# Patient Record
Sex: FEMALE | Race: BLACK OR AFRICAN AMERICAN | NOT HISPANIC OR LATINO | Employment: UNEMPLOYED | URBAN - METROPOLITAN AREA
[De-identification: names, ages, dates, MRNs, and addresses within clinical notes are randomized per-mention and may not be internally consistent; named-entity substitution may affect disease eponyms.]

---

## 2024-04-29 ENCOUNTER — LAB (OUTPATIENT)
Dept: LAB | Facility: LAB | Age: 69
End: 2024-04-29
Payer: MEDICARE

## 2024-04-29 ENCOUNTER — OFFICE VISIT (OUTPATIENT)
Dept: PRIMARY CARE | Facility: CLINIC | Age: 69
End: 2024-04-29
Payer: MEDICARE

## 2024-04-29 VITALS
OXYGEN SATURATION: 99 % | BODY MASS INDEX: 30.94 KG/M2 | DIASTOLIC BLOOD PRESSURE: 144 MMHG | HEIGHT: 71 IN | SYSTOLIC BLOOD PRESSURE: 228 MMHG | TEMPERATURE: 97.2 F | WEIGHT: 221 LBS | HEART RATE: 72 BPM

## 2024-04-29 DIAGNOSIS — I10 PRIMARY HYPERTENSION: ICD-10-CM

## 2024-04-29 DIAGNOSIS — E89.0 H/O THYROIDECTOMY: ICD-10-CM

## 2024-04-29 DIAGNOSIS — Z86.2 HISTORY OF ANEMIA: ICD-10-CM

## 2024-04-29 DIAGNOSIS — I10 PRIMARY HYPERTENSION: Primary | ICD-10-CM

## 2024-04-29 DIAGNOSIS — N18.32 CHRONIC KIDNEY DISEASE, STAGE 3B (MULTI): ICD-10-CM

## 2024-04-29 DIAGNOSIS — R73.03 PREDIABETES: ICD-10-CM

## 2024-04-29 DIAGNOSIS — Z86.39 HISTORY OF VITAMIN D DEFICIENCY: ICD-10-CM

## 2024-04-29 LAB
ALBUMIN SERPL BCP-MCNC: 3.3 G/DL (ref 3.4–5)
ALP SERPL-CCNC: 150 U/L (ref 33–136)
ALT SERPL W P-5'-P-CCNC: 13 U/L (ref 7–45)
ANION GAP SERPL CALC-SCNC: 13 MMOL/L (ref 10–20)
AST SERPL W P-5'-P-CCNC: 22 U/L (ref 9–39)
BILIRUB SERPL-MCNC: 0.5 MG/DL (ref 0–1.2)
BUN SERPL-MCNC: 19 MG/DL (ref 6–23)
CALCIUM SERPL-MCNC: 9.3 MG/DL (ref 8.6–10.6)
CHLORIDE SERPL-SCNC: 103 MMOL/L (ref 98–107)
CHOLEST SERPL-MCNC: 327 MG/DL (ref 0–199)
CHOLESTEROL/HDL RATIO: 5.4
CO2 SERPL-SCNC: 27 MMOL/L (ref 21–32)
CREAT SERPL-MCNC: 1.37 MG/DL (ref 0.5–1.05)
CREAT UR-MCNC: 206.8 MG/DL (ref 20–320)
EGFRCR SERPLBLD CKD-EPI 2021: 42 ML/MIN/1.73M*2
ERYTHROCYTE [DISTWIDTH] IN BLOOD BY AUTOMATED COUNT: 16.7 % (ref 11.5–14.5)
EST. AVERAGE GLUCOSE BLD GHB EST-MCNC: 114 MG/DL
GLUCOSE SERPL-MCNC: 90 MG/DL (ref 74–99)
HBA1C MFR BLD: 5.6 %
HCT VFR BLD AUTO: 46.2 % (ref 36–46)
HDLC SERPL-MCNC: 60.1 MG/DL
HGB BLD-MCNC: 14.5 G/DL (ref 12–16)
LDLC SERPL CALC-MCNC: 236 MG/DL
MCH RBC QN AUTO: 25.8 PG (ref 26–34)
MCHC RBC AUTO-ENTMCNC: 31.4 G/DL (ref 32–36)
MCV RBC AUTO: 82 FL (ref 80–100)
MICROALBUMIN UR-MCNC: >2250 MG/L
MICROALBUMIN/CREAT UR: NORMAL MG/G{CREAT}
NON HDL CHOLESTEROL: 267 MG/DL (ref 0–149)
NRBC BLD-RTO: 0 /100 WBCS (ref 0–0)
PLATELET # BLD AUTO: 284 X10*3/UL (ref 150–450)
POTASSIUM SERPL-SCNC: 3.9 MMOL/L (ref 3.5–5.3)
PROT SERPL-MCNC: 7.1 G/DL (ref 6.4–8.2)
RBC # BLD AUTO: 5.61 X10*6/UL (ref 4–5.2)
SODIUM SERPL-SCNC: 139 MMOL/L (ref 136–145)
T4 FREE SERPL-MCNC: 1.34 NG/DL (ref 0.78–1.48)
TRIGL SERPL-MCNC: 153 MG/DL (ref 0–149)
TSH SERPL-ACNC: 11.52 MIU/L (ref 0.44–3.98)
VLDL: 31 MG/DL (ref 0–40)
WBC # BLD AUTO: 7.3 X10*3/UL (ref 4.4–11.3)

## 2024-04-29 PROCEDURE — 84439 ASSAY OF FREE THYROXINE: CPT

## 2024-04-29 PROCEDURE — 84443 ASSAY THYROID STIM HORMONE: CPT

## 2024-04-29 PROCEDURE — 82043 UR ALBUMIN QUANTITATIVE: CPT

## 2024-04-29 PROCEDURE — 3077F SYST BP >= 140 MM HG: CPT | Performed by: STUDENT IN AN ORGANIZED HEALTH CARE EDUCATION/TRAINING PROGRAM

## 2024-04-29 PROCEDURE — 85027 COMPLETE CBC AUTOMATED: CPT

## 2024-04-29 PROCEDURE — 36415 COLL VENOUS BLD VENIPUNCTURE: CPT

## 2024-04-29 PROCEDURE — G2212 PROLONG OUTPT/OFFICE VIS: HCPCS | Performed by: STUDENT IN AN ORGANIZED HEALTH CARE EDUCATION/TRAINING PROGRAM

## 2024-04-29 PROCEDURE — 1159F MED LIST DOCD IN RCRD: CPT | Performed by: STUDENT IN AN ORGANIZED HEALTH CARE EDUCATION/TRAINING PROGRAM

## 2024-04-29 PROCEDURE — 99214 OFFICE O/P EST MOD 30 MIN: CPT | Performed by: STUDENT IN AN ORGANIZED HEALTH CARE EDUCATION/TRAINING PROGRAM

## 2024-04-29 PROCEDURE — 82570 ASSAY OF URINE CREATININE: CPT

## 2024-04-29 PROCEDURE — 80053 COMPREHEN METABOLIC PANEL: CPT

## 2024-04-29 PROCEDURE — 1126F AMNT PAIN NOTED NONE PRSNT: CPT | Performed by: STUDENT IN AN ORGANIZED HEALTH CARE EDUCATION/TRAINING PROGRAM

## 2024-04-29 PROCEDURE — 1036F TOBACCO NON-USER: CPT | Performed by: STUDENT IN AN ORGANIZED HEALTH CARE EDUCATION/TRAINING PROGRAM

## 2024-04-29 PROCEDURE — 83036 HEMOGLOBIN GLYCOSYLATED A1C: CPT

## 2024-04-29 PROCEDURE — 80061 LIPID PANEL: CPT

## 2024-04-29 PROCEDURE — 3080F DIAST BP >= 90 MM HG: CPT | Performed by: STUDENT IN AN ORGANIZED HEALTH CARE EDUCATION/TRAINING PROGRAM

## 2024-04-29 RX ORDER — LEVOTHYROXINE SODIUM 125 UG/1
125 TABLET ORAL DAILY
Qty: 90 TABLET | Refills: 3 | Status: SHIPPED | OUTPATIENT
Start: 2024-04-29 | End: 2024-04-30 | Stop reason: SDUPTHER

## 2024-04-29 RX ORDER — LEVOTHYROXINE SODIUM 125 UG/1
125 TABLET ORAL DAILY
COMMUNITY
End: 2024-04-29 | Stop reason: SDUPTHER

## 2024-04-29 RX ORDER — LISINOPRIL 40 MG/1
40 TABLET ORAL DAILY
Qty: 90 TABLET | Refills: 3 | Status: SHIPPED | OUTPATIENT
Start: 2024-04-29

## 2024-04-29 RX ORDER — FERROUS SULFATE 324(65)MG
1 TABLET, DELAYED RELEASE (ENTERIC COATED) ORAL DAILY
Qty: 90 TABLET | Refills: 3 | Status: SHIPPED | OUTPATIENT
Start: 2024-04-29

## 2024-04-29 RX ORDER — TRIAMTERENE/HYDROCHLOROTHIAZID 37.5-25 MG
1 TABLET ORAL DAILY
COMMUNITY
End: 2024-04-29 | Stop reason: SDUPTHER

## 2024-04-29 RX ORDER — ATORVASTATIN CALCIUM 40 MG/1
40 TABLET, FILM COATED ORAL NIGHTLY
Qty: 90 TABLET | Refills: 3 | Status: SHIPPED | OUTPATIENT
Start: 2024-04-29

## 2024-04-29 RX ORDER — AMPICILLIN TRIHYDRATE 500 MG
25 CAPSULE ORAL DAILY
Qty: 90 CAPSULE | Refills: 3 | Status: SHIPPED | OUTPATIENT
Start: 2024-04-29

## 2024-04-29 RX ORDER — TRIAMTERENE/HYDROCHLOROTHIAZID 37.5-25 MG
1 TABLET ORAL DAILY
Qty: 90 TABLET | Refills: 3 | Status: SHIPPED | OUTPATIENT
Start: 2024-04-29

## 2024-04-29 RX ORDER — AMPICILLIN TRIHYDRATE 500 MG
25 CAPSULE ORAL DAILY
COMMUNITY
End: 2024-04-29 | Stop reason: SDUPTHER

## 2024-04-29 RX ORDER — FERROUS SULFATE 324(65)MG
1 TABLET, DELAYED RELEASE (ENTERIC COATED) ORAL DAILY
COMMUNITY
End: 2024-04-29 | Stop reason: SDUPTHER

## 2024-04-29 RX ORDER — ATORVASTATIN CALCIUM 40 MG/1
40 TABLET, FILM COATED ORAL NIGHTLY
COMMUNITY
End: 2024-04-29 | Stop reason: SDUPTHER

## 2024-04-29 RX ORDER — LISINOPRIL 40 MG/1
40 TABLET ORAL DAILY
COMMUNITY
Start: 2023-10-31 | End: 2024-04-29 | Stop reason: SDUPTHER

## 2024-04-29 ASSESSMENT — ENCOUNTER SYMPTOMS
UNEXPECTED WEIGHT CHANGE: 0
COUGH: 0
FATIGUE: 1
ABDOMINAL PAIN: 0
APPETITE CHANGE: 0
DIZZINESS: 0
SHORTNESS OF BREATH: 0
HEADACHES: 0
BLOOD IN STOOL: 0
CONSTIPATION: 0
DIARRHEA: 0
FEVER: 0
FREQUENCY: 1
CHILLS: 0
LIGHT-HEADEDNESS: 0
PALPITATIONS: 0
DYSURIA: 0

## 2024-04-29 ASSESSMENT — PAIN SCALES - GENERAL: PAINLEVEL: 0-NO PAIN

## 2024-04-29 NOTE — PROGRESS NOTES
"Subjective   Patient ID: Renata Arias is a 68 y.o. female who presents for Med Refill and Establish Care.    HPI   - Graves disease  Denies heat intolerance, weight gain  Takes thyroid supplementation    -Hx of anemia  Endorses fatigue. Denies SOB  Is on iron supplementation    -HTN  Denies HA, vision changes, chest pain, palpitations, leg swelling  Hasn't taken medications in two weeks  Has BP cuff at home but doesn't know how to use it  Blood pressure tracker and goal     -Urge incontinence and nocturia  Discussed not drinking water after 5pm    -Hip Pain  Worse with walking, not interested in taking Tylenol    Medication refill  - Levothyroxine, atorvastatin, lisinopril, triamterene-hydrochlorothiazide, vitamin D3, ferrous sulfate    Review of Systems   Constitutional:  Positive for fatigue. Negative for appetite change, chills, fever and unexpected weight change.   Respiratory:  Negative for cough and shortness of breath.    Cardiovascular:  Negative for chest pain, palpitations and leg swelling.   Gastrointestinal:  Negative for abdominal pain, blood in stool, constipation and diarrhea.   Genitourinary:  Positive for frequency and urgency. Negative for dysuria and vaginal pain.        Nocturia, endorses urge incontinence   Neurological:  Negative for dizziness, light-headedness and headaches.       Objective   BP (!) 228/144 (BP Location: Left arm, Patient Position: Sitting)   Pulse 72   Temp 36.2 °C (97.2 °F) (Temporal)   Ht 1.803 m (5' 11\")   Wt 100 kg (221 lb)   SpO2 99%   BMI 30.82 kg/m²     Physical Exam  Constitutional:       Appearance: Normal appearance. She is normal weight.   Cardiovascular:      Rate and Rhythm: Normal rate and regular rhythm.      Pulses: Normal pulses.      Heart sounds: Normal heart sounds.   Pulmonary:      Effort: Pulmonary effort is normal.      Breath sounds: Normal breath sounds.   Abdominal:      General: Abdomen is flat.      Palpations: Abdomen is soft. "   Musculoskeletal:      Cervical back: Normal range of motion.      Right lower leg: No edema.      Left lower leg: No edema.   Skin:     General: Skin is warm and dry.   Neurological:      General: No focal deficit present.       Assessment/Plan   67yo female with PMHx HTN (on lisinopril, tiramterene-hydrochlorothiazide), anemia, Graves disease (on levothyroxine), presenting for medication refills.    Diagnoses and all orders for this visit:  Primary hypertension  Chronic, uncontrolled  Likely 2.2 to med noncompliance as patient has not followed up in clinic for over 1 year  228/144, 180/120 on repeat; asymptomatic  -     Comprehensive metabolic panel; Future  -     cw triamterene-hydrochlorothiazid (Maxzide-25) 37.5-25 mg tablet; Take 1 tablet by mouth once daily.  -    cw   lisinopril 40 mg tablet; Take 1 tablet (40 mg) by mouth once daily. as directed  -     Referral to Ophthalmology; Future  -     Albumin, urine, random; Future  History of anemia  Chronic, stable   -     CBC; Future  -     ferrous sulfate 324 mg (65 mg elemental iron) EC tablet (delayed release); Take 1 tablet (65 mg) by mouth once daily.  History of vitamin D deficiency  Chronic, stable   -     Vitamin D3 25 mcg (1,000 unit) capsule; Take 1 capsule (25 mcg) by mouth once daily.  Prediabetes  Chronic, not at goal  Lab Results   Component Value Date    HGBA1C 5.7 (A) 04/25/2022   -     Hemoglobin A1c; Future  -     Lipid panel; Future  -     atorvastatin (Lipitor) 40 mg tablet; Take 1 tablet (40 mg) by mouth once daily at bedtime.  -     Albumin, urine, random; Future    H/O Graves disease s/p thyroidectomy  Chronic, stable   -     Tsh With Reflex To Free T4 If Abnormal; Future  -     cw levothyroxine (Synthroid, Levoxyl) 125 mcg tablet; Take 1 tablet (125 mcg) by mouth once daily.  Chronic kidney disease, stage 3b (Multi)  Chronic, complicated  -     Albumin, urine, random; Future  -rfp pending     Hip pain  Chronic, stable  -patient declined  meds at this time  -stretching exercises work sheet given    Citlali Bright, M3  [employer] [employer]     I saw and evaluated the patient. I personally obtained the key and critical portions of the history and physical exam. I reviewed and modified the student's documentation of the HPI and discussed the patient with the medical student. I agree with the above documentation of the HPI.    RTC IN 1 Month for a fu on HTN

## 2024-04-30 DIAGNOSIS — E89.0 H/O THYROIDECTOMY: ICD-10-CM

## 2024-04-30 RX ORDER — LEVOTHYROXINE SODIUM 150 UG/1
150 TABLET ORAL DAILY
Qty: 90 TABLET | Refills: 3 | Status: SHIPPED | OUTPATIENT
Start: 2024-04-30

## 2024-04-30 NOTE — PROGRESS NOTES
I saw and evaluated the patient. I personally obtained the key and critical portions of the history and physical exam or was physically present for key and critical portions performed by the resident/fellow. I reviewed the resident/fellow's documentation and discussed the patient with the resident/fellow. I agree with the resident/fellow's medical decision making as documented in the note.    Brigida Sampson MD

## 2024-06-14 ENCOUNTER — APPOINTMENT (OUTPATIENT)
Dept: PRIMARY CARE | Facility: CLINIC | Age: 69
End: 2024-06-14
Payer: MEDICARE

## 2024-06-14 ENCOUNTER — LAB (OUTPATIENT)
Dept: LAB | Facility: LAB | Age: 69
End: 2024-06-14
Payer: MEDICARE

## 2024-06-14 VITALS
DIASTOLIC BLOOD PRESSURE: 90 MMHG | WEIGHT: 225 LBS | TEMPERATURE: 97.4 F | HEIGHT: 72 IN | BODY MASS INDEX: 30.48 KG/M2 | HEART RATE: 66 BPM | SYSTOLIC BLOOD PRESSURE: 170 MMHG | OXYGEN SATURATION: 97 %

## 2024-06-14 DIAGNOSIS — I10 PRIMARY HYPERTENSION: Primary | ICD-10-CM

## 2024-06-14 DIAGNOSIS — E89.0 H/O THYROIDECTOMY: ICD-10-CM

## 2024-06-14 PROBLEM — H52.4 BILATERAL PRESBYOPIA: Status: ACTIVE | Noted: 2024-06-14

## 2024-06-14 PROBLEM — M22.2X9 DISORDER OF PATELLOFEMORAL JOINT: Status: ACTIVE | Noted: 2024-06-14

## 2024-06-14 PROBLEM — H01.003 BLEPHARITIS, BOTH EYES: Status: ACTIVE | Noted: 2024-06-14

## 2024-06-14 PROBLEM — R73.09 ABNORMAL BLOOD SUGAR: Status: ACTIVE | Noted: 2017-03-31

## 2024-06-14 PROBLEM — E66.9 OBESITY, CLASS I, BMI 30-34.9: Status: ACTIVE | Noted: 2024-06-14

## 2024-06-14 PROBLEM — Z86.2 HISTORY OF ANEMIA: Status: ACTIVE | Noted: 2024-06-14

## 2024-06-14 PROBLEM — E78.5 HYPERLIPIDEMIA: Status: ACTIVE | Noted: 2017-03-31

## 2024-06-14 PROBLEM — N18.9 CKD (CHRONIC KIDNEY DISEASE): Status: ACTIVE | Noted: 2018-02-19

## 2024-06-14 PROBLEM — E66.811 OBESITY, CLASS I, BMI 30-34.9: Status: ACTIVE | Noted: 2024-06-14

## 2024-06-14 PROBLEM — N64.89 BREAST ASYMMETRY: Status: ACTIVE | Noted: 2018-02-19

## 2024-06-14 PROBLEM — H25.12 AGE-RELATED NUCLEAR CATARACT OF LEFT EYE: Status: ACTIVE | Noted: 2024-06-14

## 2024-06-14 PROBLEM — H01.006 BLEPHARITIS, BOTH EYES: Status: ACTIVE | Noted: 2024-06-14

## 2024-06-14 PROBLEM — H54.7 VISION IMPAIRMENT: Status: ACTIVE | Noted: 2024-06-14

## 2024-06-14 PROBLEM — Z86.39 HISTORY OF NUTRITIONAL DEFICIENCY: Status: ACTIVE | Noted: 2024-06-14

## 2024-06-14 PROBLEM — H05.20 EXOPHTHALMOS: Status: ACTIVE | Noted: 2018-02-19

## 2024-06-14 PROBLEM — I27.20 PULMONARY HYPERTENSION (MULTI): Status: ACTIVE | Noted: 2024-06-14

## 2024-06-14 PROBLEM — E55.9 VITAMIN D DEFICIENCY: Status: ACTIVE | Noted: 2024-06-14

## 2024-06-14 PROBLEM — R73.03 PREDIABETES: Status: ACTIVE | Noted: 2024-06-14

## 2024-06-14 PROBLEM — E05.00 GRAVES' DISEASE: Status: ACTIVE | Noted: 2024-06-14

## 2024-06-14 LAB — TSH SERPL-ACNC: 1.08 MIU/L (ref 0.44–3.98)

## 2024-06-14 PROCEDURE — 84443 ASSAY THYROID STIM HORMONE: CPT

## 2024-06-14 RX ORDER — LISINOPRIL AND HYDROCHLOROTHIAZIDE 20; 25 MG/1; MG/1
2 TABLET ORAL DAILY
Qty: 60 TABLET | Refills: 11 | Status: SHIPPED | OUTPATIENT
Start: 2024-06-14 | End: 2025-06-14

## 2024-06-14 ASSESSMENT — PATIENT HEALTH QUESTIONNAIRE - PHQ9
1. LITTLE INTEREST OR PLEASURE IN DOING THINGS: NOT AT ALL
2. FEELING DOWN, DEPRESSED OR HOPELESS: NOT AT ALL
SUM OF ALL RESPONSES TO PHQ9 QUESTIONS 1 AND 2: 0

## 2024-06-14 ASSESSMENT — ENCOUNTER SYMPTOMS: DEPRESSION: 0

## 2024-06-14 ASSESSMENT — PAIN SCALES - GENERAL: PAINLEVEL: 5

## 2024-06-14 NOTE — PROGRESS NOTES
Subjective   Patient ID: Renata Arias is a 68 y.o. female who presents for Follow-up.      HPI  #htn  Endorses complaince with meds  Took her bp meds this morning   Has a bp machine at home , readings in 170s systolic and 110s diastolic   Denies chest pain, sob, dizziness, headaches  She did go to see the eye doctor- [facility]   She was told she has spot in her eye so she will be going back for a follow up  ASCVD 24%- on a statin     #hx of graves  Endorses compliance with medication    Review of Systems  ROS negative except for above mentioned.    Objective   BP (!) 156/106 (BP Location: Right arm, Patient Position: Sitting)   Pulse 71   Temp 36.3 °C (97.4 °F) (Temporal)   Ht 1.829 m (6')   Wt 102 kg (225 lb)   SpO2 97%   BMI 30.52 kg/m²     Physical Exam  General: Well developed, well nourished, alert and cooperative, appears to be in no acute distress.   HEENT: Normocephalic, atraumatic.   Cardiac: Normal S1 and S2. No S3, S4, or murmurs. Rhythm is regular. No peripheral edema, cyanosis, or pallor. Extremities warm and well perfused.   Lungs: Clear to auscultation bilaterally. No rales, rhonchi, wheezing, diminished breath sounds.   MSK: ROM intact spine and extremities.  Neuro: CN II-XII grossly intact.   Skin: Skin normal color, texture, and turgor with no lesions or eruptions.   Psych: Oriented to person, place, and time.     Assessment/Plan   67yo female with PMHx HTN (on lisinopril, tiramterene-hydrochlorothiazide), anemia, Graves disease s/p thyroidectomy (on levothyroxine) presents for a follow up visit. Blood pressure in office above goal, rechecked on manual reading with 170/90. Plan to titrate patients bp meds today for better control.   Problem List Items Addressed This Visit       Hypertension - Primary  Chronic, not at goal     Relevant Medications    Start lisinopriL-hydrochlorothiazide 20-25 mg tablet, 2 tablets once daily  Stop Lisinopril 40 mg and stop Maxzide 37.5-2 mg  Continue with  home bp monitoring  Hx of Graves disease  S/p thyroidectomy  Chronic, not at goal  -TSH ordered  Cw Synthroid 150 mcg      RTC in 1 month for a FU on HTN    Patient discussed with attending, Dr. Rudi You MD  Family Medicine, PGY-3

## 2024-07-07 NOTE — PROGRESS NOTES
I reviewed the resident/fellow's documentation and discussed the patient with the resident/fellow. I agree with the resident/fellow's medical decision making as documented in the note.    Claudine Grijalva MD

## 2024-07-22 ENCOUNTER — APPOINTMENT (OUTPATIENT)
Dept: PRIMARY CARE | Facility: CLINIC | Age: 69
End: 2024-07-22
Payer: COMMERCIAL

## 2024-09-09 ENCOUNTER — APPOINTMENT (OUTPATIENT)
Dept: PRIMARY CARE | Facility: CLINIC | Age: 69
End: 2024-09-09
Payer: MEDICARE

## 2024-09-09 VITALS
HEIGHT: 72 IN | SYSTOLIC BLOOD PRESSURE: 165 MMHG | BODY MASS INDEX: 31.97 KG/M2 | DIASTOLIC BLOOD PRESSURE: 87 MMHG | HEART RATE: 66 BPM | WEIGHT: 236 LBS | TEMPERATURE: 96.7 F | OXYGEN SATURATION: 98 %

## 2024-09-09 DIAGNOSIS — I10 PRIMARY HYPERTENSION: Primary | ICD-10-CM

## 2024-09-09 PROCEDURE — 1126F AMNT PAIN NOTED NONE PRSNT: CPT

## 2024-09-09 PROCEDURE — 99213 OFFICE O/P EST LOW 20 MIN: CPT

## 2024-09-09 PROCEDURE — 3077F SYST BP >= 140 MM HG: CPT

## 2024-09-09 PROCEDURE — 1159F MED LIST DOCD IN RCRD: CPT

## 2024-09-09 PROCEDURE — 3079F DIAST BP 80-89 MM HG: CPT

## 2024-09-09 PROCEDURE — 3008F BODY MASS INDEX DOCD: CPT

## 2024-09-09 ASSESSMENT — ENCOUNTER SYMPTOMS
HEADACHES: 0
EYE REDNESS: 0
WHEEZING: 0
FATIGUE: 0
SHORTNESS OF BREATH: 0
DIARRHEA: 0
POLYDIPSIA: 0
PALPITATIONS: 0
FEVER: 0
APNEA: 0
EYE PAIN: 0
CHEST TIGHTNESS: 0
PHOTOPHOBIA: 0
APPETITE CHANGE: 0
LOSS OF SENSATION IN FEET: 0
DIZZINESS: 0
WEAKNESS: 0
NAUSEA: 0
ABDOMINAL DISTENTION: 0
SPEECH DIFFICULTY: 0
OCCASIONAL FEELINGS OF UNSTEADINESS: 0
CONSTIPATION: 0
DEPRESSION: 0
ABDOMINAL PAIN: 0
NUMBNESS: 0
VOMITING: 0

## 2024-09-09 ASSESSMENT — PAIN SCALES - GENERAL: PAINLEVEL: 0-NO PAIN

## 2024-09-09 ASSESSMENT — COLUMBIA-SUICIDE SEVERITY RATING SCALE - C-SSRS
2. HAVE YOU ACTUALLY HAD ANY THOUGHTS OF KILLING YOURSELF?: NO
1. IN THE PAST MONTH, HAVE YOU WISHED YOU WERE DEAD OR WISHED YOU COULD GO TO SLEEP AND NOT WAKE UP?: NO
6. HAVE YOU EVER DONE ANYTHING, STARTED TO DO ANYTHING, OR PREPARED TO DO ANYTHING TO END YOUR LIFE?: NO

## 2024-09-09 NOTE — PROGRESS NOTES
Patient ID: Renata Arias is a 69 y.o. female with PMH of HTN (on lisinopril-hydrochlorothiazide), anemia, Graves disease s/p thyroidectomy (on levothyroxine) who presents for Follow-up.    Subjective     HPI  Patient reports presenting for BP management. She was seen in clinic 6/14 during which her BP was adjusted from lisinopril 40 mg and maxizide 37.5-2 mg to Lisinopril-hydrochlorothiaze 20-25mg 2 tablets daily. She reports good BP controlled at home, however she did not bring in her BP log. Reports compliance with her medications.     Review of Systems   Constitutional:  Negative for appetite change, fatigue and fever.   HENT:  Negative for ear discharge and ear pain.    Eyes:  Negative for photophobia, pain and redness.   Respiratory:  Negative for apnea, chest tightness, shortness of breath and wheezing.    Cardiovascular:  Negative for chest pain and palpitations.   Gastrointestinal:  Negative for abdominal distention, abdominal pain, constipation, diarrhea, nausea and vomiting.   Endocrine: Negative for cold intolerance, heat intolerance and polydipsia.   Neurological:  Negative for dizziness, speech difficulty, weakness, numbness and headaches.       Past Medical History:   Diagnosis Date    Chronic kidney disease, unspecified 03/19/2018    CKD (chronic kidney disease)    Chronic kidney disease, unspecified 03/14/2018    CKD (chronic kidney disease)    Hyperlipidemia, unspecified 03/19/2018    Hyperlipidemia    Hyperlipidemia, unspecified 03/14/2018    Hyperlipidemia    Other specified disorders of breast 03/19/2018    Breast asymmetry    Other specified disorders of breast 03/14/2018    Breast asymmetry    Personal history of other diseases of urinary system     History of renal insufficiency syndrome    Personal history of other diseases of urinary system 07/21/2013    History of chronic kidney disease    Prediabetes 03/14/2018    Prediabetes    Prediabetes 03/19/2018    Prediabetes    Pulmonary  hypertension, unspecified (Multi)     Pulmonary hypertension    Unspecified blepharitis left eye, unspecified eyelid 05/07/2015    Blepharitis of left eye    Unspecified blepharitis left eye, unspecified eyelid 05/07/2015    Blepharitis of left eye    Unspecified blepharitis left eye, unspecified eyelid 05/07/2015    Blepharitis of left eye    Unspecified blepharitis left eye, unspecified eyelid 05/14/2015    Blepharitis of left eye    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  Penicillins   Social History     Tobacco Use    Smoking status: Never    Smokeless tobacco: Never   Substance Use Topics    Alcohol use: Not Currently       Current Outpatient Medications on File Prior to Visit   Medication Sig Dispense Refill    atorvastatin (Lipitor) 40 mg tablet Take 1 tablet (40 mg) by mouth once daily at bedtime. 90 tablet 3    ferrous sulfate 324 mg (65 mg elemental iron) EC tablet (delayed release) Take 1 tablet (65 mg) by mouth once daily. 90 tablet 3    levothyroxine (Synthroid, Levoxyl) 150 mcg tablet Take 1 tablet (150 mcg) by mouth once daily. 90 tablet 3    lisinopriL-hydrochlorothiazide 20-25 mg tablet Take 2 tablets by mouth once daily. 60 tablet 11    Vitamin D3 25 mcg (1,000 unit) capsule Take 1 capsule (25 mcg) by mouth once daily. 90 capsule 3     No current facility-administered medications on file prior to visit.        Objective   Vitals: /87 (BP Location: Right arm, Patient Position: Sitting, BP Cuff Size: Adult)   Pulse 66   Temp 35.9 °C (96.7 °F) (Temporal)   Ht 1.829 m (6')   Wt 107 kg (236 lb)   SpO2 98%   BMI 32.01 kg/m²      Physical Exam  Vitals reviewed.    Constitutional:       General: She is not in acute distress.     Appearance: Normal appearance.   HENT:      Head: Atraumatic.      Right Ear: External ear normal.      Left Ear: External ear normal.      Mouth/Throat:      Mouth: Mucous membranes are moist.      Pharynx: Oropharynx is clear.   Eyes:      Extraocular Movements: Extraocular movements intact.      Conjunctiva/sclera: Conjunctivae normal.      Pupils: Pupils are equal, round, and reactive to light.   Cardiovascular:      Rate and Rhythm: Normal rate and regular rhythm.      Pulses: Normal pulses.      Heart sounds: Normal heart sounds. No murmur heard.     No gallop.   Pulmonary:      Effort: No respiratory distress.      Breath sounds: Normal breath sounds. No wheezing or rales.   Abdominal:      General: Bowel sounds are normal.      Palpations: There is no mass.      Tenderness: There is no abdominal tenderness. There is no guarding.   Musculoskeletal:         General: No swelling. Normal range of motion.      Cervical back: Normal range of motion. No tenderness.   Lymphadenopathy:      Cervical: No cervical adenopathy.   Skin:     General: Skin is warm and dry.      Capillary Refill: Capillary refill takes less than 2 seconds.   Neurological:      Mental Status: She is alert.         Assessment/Plan   69 yr old female with pmh of HTN and grave's who presents for BP management    Problem List Items Addressed This Visit    None    #BP   - Patient BP not at goal in clinic at 165/87. Recheck 170/80  - Patient reports BP well controlled at home on current regimen of lisinopril-hydrochlorothiazide   - Discussed monitoring salt intake and avoiding outside foods. Increase intake of nonprocessed foods  - Recommended exercising for at least 20 mins daily at least 3x weekly   - Discussed recording home BP, BP log provided. Patient to bring during fu visit  - If BP remains elevated on current medication will add a calcium channel blocker     Patient d/w  attending Dr. Erazo     RTC in 1 month for BP check, or earlier as needed.    Serge Mosquera MD   Family Medicine, PGY2

## 2024-09-11 NOTE — PROGRESS NOTES
I reviewed the resident's documentation and discussed the patient with the resident. I agree with the resident's medical decision making as documented in the note.     Josee Erazo MD

## 2024-10-10 ENCOUNTER — APPOINTMENT (OUTPATIENT)
Dept: PRIMARY CARE | Facility: CLINIC | Age: 69
End: 2024-10-10
Payer: MEDICARE

## 2024-10-10 VITALS
WEIGHT: 238.2 LBS | DIASTOLIC BLOOD PRESSURE: 97 MMHG | SYSTOLIC BLOOD PRESSURE: 184 MMHG | HEIGHT: 72 IN | RESPIRATION RATE: 18 BRPM | OXYGEN SATURATION: 98 % | TEMPERATURE: 98 F | BODY MASS INDEX: 32.26 KG/M2 | HEART RATE: 64 BPM

## 2024-10-10 DIAGNOSIS — I10 PRIMARY HYPERTENSION: ICD-10-CM

## 2024-10-10 DIAGNOSIS — N18.32 CHRONIC KIDNEY DISEASE, STAGE 3B (MULTI): Primary | ICD-10-CM

## 2024-10-10 PROCEDURE — 3077F SYST BP >= 140 MM HG: CPT

## 2024-10-10 PROCEDURE — 1126F AMNT PAIN NOTED NONE PRSNT: CPT

## 2024-10-10 PROCEDURE — 3008F BODY MASS INDEX DOCD: CPT

## 2024-10-10 PROCEDURE — 3080F DIAST BP >= 90 MM HG: CPT

## 2024-10-10 PROCEDURE — 1159F MED LIST DOCD IN RCRD: CPT

## 2024-10-10 PROCEDURE — 99213 OFFICE O/P EST LOW 20 MIN: CPT

## 2024-10-10 RX ORDER — AMLODIPINE BESYLATE 5 MG/1
5 TABLET ORAL DAILY
Qty: 30 TABLET | Refills: 5 | Status: SHIPPED | OUTPATIENT
Start: 2024-10-10 | End: 2025-04-08

## 2024-10-10 ASSESSMENT — PATIENT HEALTH QUESTIONNAIRE - PHQ9
2. FEELING DOWN, DEPRESSED OR HOPELESS: NOT AT ALL
1. LITTLE INTEREST OR PLEASURE IN DOING THINGS: NOT AT ALL
SUM OF ALL RESPONSES TO PHQ9 QUESTIONS 1 AND 2: 0

## 2024-10-10 ASSESSMENT — COLUMBIA-SUICIDE SEVERITY RATING SCALE - C-SSRS
1. IN THE PAST MONTH, HAVE YOU WISHED YOU WERE DEAD OR WISHED YOU COULD GO TO SLEEP AND NOT WAKE UP?: NO
2. HAVE YOU ACTUALLY HAD ANY THOUGHTS OF KILLING YOURSELF?: NO

## 2024-10-10 ASSESSMENT — ENCOUNTER SYMPTOMS
DEPRESSION: 0
OCCASIONAL FEELINGS OF UNSTEADINESS: 0
LOSS OF SENSATION IN FEET: 0

## 2024-10-10 ASSESSMENT — PAIN SCALES - GENERAL: PAINLEVEL: 0-NO PAIN

## 2024-10-10 NOTE — PROGRESS NOTES
Patient ID: Renata Arias is a 69 y.o. female with PMH of  HTN (on lisinopril-hydrochlorothiazide), anemia, Graves disease s/p thyroidectomy (on levothyroxine)  who presents for Follow-up.    Subjective     HPI  Patient presents for BP check, she brought her blood pressure log with her. BP ranges between 120s-160s systolic and 70s-100s diastolic. Patient brought in her home BP monitor to make sure she was recording her BP accurately. She denies any other concerns or HTN symptoms.     Review of Systems   All other systems reviewed and are negative.    Past Medical History:   Diagnosis Date    Chronic kidney disease, unspecified 03/19/2018    CKD (chronic kidney disease)    Chronic kidney disease, unspecified 03/14/2018    CKD (chronic kidney disease)    Hyperlipidemia, unspecified 03/19/2018    Hyperlipidemia    Hyperlipidemia, unspecified 03/14/2018    Hyperlipidemia    Other specified disorders of breast 03/19/2018    Breast asymmetry    Other specified disorders of breast 03/14/2018    Breast asymmetry    Personal history of other diseases of urinary system     History of renal insufficiency syndrome    Personal history of other diseases of urinary system 07/21/2013    History of chronic kidney disease    Prediabetes 03/14/2018    Prediabetes    Prediabetes 03/19/2018    Prediabetes    Pulmonary hypertension, unspecified (Multi)     Pulmonary hypertension    Unspecified blepharitis left eye, unspecified eyelid 05/07/2015    Blepharitis of left eye    Unspecified blepharitis left eye, unspecified eyelid 05/07/2015    Blepharitis of left eye    Unspecified blepharitis left eye, unspecified eyelid 05/07/2015    Blepharitis of left eye    Unspecified blepharitis left eye, unspecified eyelid 05/14/2015    Blepharitis of left eye    Unspecified exophthalmos 03/19/2018    Exophthalmos    Unspecified exophthalmos 03/14/2018    Exophthalmos    Unspecified visual loss 03/19/2018    Vision impairment    Unspecified visual  loss 03/14/2018    Vision impairment     Past Surgical History:   Procedure Laterality Date    THYROID SURGERY  08/07/2013    Thyroid Surgery     No family history on file.  Penicillins   Social History     Tobacco Use    Smoking status: Never    Smokeless tobacco: Never   Substance Use Topics    Alcohol use: Not Currently       Current Outpatient Medications on File Prior to Visit   Medication Sig Dispense Refill    atorvastatin (Lipitor) 40 mg tablet Take 1 tablet (40 mg) by mouth once daily at bedtime. 90 tablet 3    ferrous sulfate 324 mg (65 mg elemental iron) EC tablet (delayed release) Take 1 tablet (65 mg) by mouth once daily. 90 tablet 3    levothyroxine (Synthroid, Levoxyl) 150 mcg tablet Take 1 tablet (150 mcg) by mouth once daily. 90 tablet 3    lisinopriL-hydrochlorothiazide 20-25 mg tablet Take 2 tablets by mouth once daily. 60 tablet 11    Vitamin D3 25 mcg (1,000 unit) capsule Take 1 capsule (25 mcg) by mouth once daily. 90 capsule 3     No current facility-administered medications on file prior to visit.        Objective   Vitals: BP (!) 184/97   Pulse 64   Temp 36.7 °C (98 °F) (Temporal)   Resp 18   Ht 1.829 m (6')   Wt 108 kg (238 lb 3.2 oz)   SpO2 98%   BMI 32.31 kg/m²      Physical Exam  Vitals reviewed.   Constitutional:       General: She is not in acute distress.     Appearance: Normal appearance.   HENT:      Head: Atraumatic.      Right Ear: External ear normal.      Left Ear: External ear normal.      Mouth/Throat:      Mouth: Mucous membranes are moist.      Pharynx: Oropharynx is clear.   Eyes:      Extraocular Movements: Extraocular movements intact.      Conjunctiva/sclera: Conjunctivae normal.      Pupils: Pupils are equal, round, and reactive to light.   Cardiovascular:      Rate and Rhythm: Normal rate and regular rhythm.      Pulses: Normal pulses.      Heart sounds: Normal heart sounds. No murmur heard.     No gallop.   Pulmonary:      Effort: No respiratory distress.       Breath sounds: Normal breath sounds. No wheezing or rales.   Abdominal:      General: Bowel sounds are normal.      Palpations: There is no mass.      Tenderness: There is no abdominal tenderness. There is no guarding.   Musculoskeletal:         General: No swelling. Normal range of motion.      Cervical back: Normal range of motion. No tenderness.   Lymphadenopathy:      Cervical: No cervical adenopathy.   Skin:     General: Skin is warm and dry.      Capillary Refill: Capillary refill takes less than 2 seconds.   Neurological:      Mental Status: She is alert.         Assessment/Plan   69 yr old female with pmh of HTN and grave's who presents for BP management    Problem List Items Addressed This Visit       Hypertension    Relevant Medications    amLODIPine (Norvasc) 5 mg tablet     #BP   - Patient's BP not at goal in clinic at 184/100   - Pt reported did not take her medication this morning   - Patient reports BP well controlled at home on current regimen of lisinopril-hydrochlorothiazide. Home BP log with some elevated BP to 170s   - Discussed monitoring salt intake and avoiding outside foods. Increase intake of nonprocessed foods  - Discussed continue recording home BP, BP log provided. Patient to bring during fu visit  - Given BP still not at goal, added calcium channel blocker to BP regimen     Patient d/w attending Dr. Reji SHAH in 2 months, or earlier as needed.    Serge Mosquera MD   Family Medicine, PGY2

## 2024-10-13 PROBLEM — N18.32 CHRONIC KIDNEY DISEASE, STAGE 3B (MULTI): Status: ACTIVE | Noted: 2018-02-19

## 2024-10-13 PROBLEM — N18.32 CHRONIC KIDNEY DISEASE, STAGE 3B (MULTI): Status: ACTIVE | Noted: 2024-10-13

## 2024-10-13 PROBLEM — R73.03 PREDIABETES: Status: ACTIVE | Noted: 2017-03-31

## 2024-12-12 ENCOUNTER — APPOINTMENT (OUTPATIENT)
Dept: PRIMARY CARE | Facility: CLINIC | Age: 69
End: 2024-12-12
Payer: MEDICARE

## 2024-12-12 VITALS
OXYGEN SATURATION: 97 % | BODY MASS INDEX: 31.97 KG/M2 | HEIGHT: 72 IN | TEMPERATURE: 98.2 F | RESPIRATION RATE: 18 BRPM | HEART RATE: 69 BPM | WEIGHT: 236 LBS | SYSTOLIC BLOOD PRESSURE: 169 MMHG | DIASTOLIC BLOOD PRESSURE: 89 MMHG

## 2024-12-12 DIAGNOSIS — I10 PRIMARY HYPERTENSION: Primary | ICD-10-CM

## 2024-12-12 PROCEDURE — 3008F BODY MASS INDEX DOCD: CPT

## 2024-12-12 PROCEDURE — 99213 OFFICE O/P EST LOW 20 MIN: CPT

## 2024-12-12 PROCEDURE — G2211 COMPLEX E/M VISIT ADD ON: HCPCS

## 2024-12-12 PROCEDURE — 1159F MED LIST DOCD IN RCRD: CPT

## 2024-12-12 PROCEDURE — 3079F DIAST BP 80-89 MM HG: CPT

## 2024-12-12 PROCEDURE — 1036F TOBACCO NON-USER: CPT

## 2024-12-12 PROCEDURE — 3077F SYST BP >= 140 MM HG: CPT

## 2024-12-12 PROCEDURE — 1126F AMNT PAIN NOTED NONE PRSNT: CPT

## 2024-12-12 ASSESSMENT — PATIENT HEALTH QUESTIONNAIRE - PHQ9
SUM OF ALL RESPONSES TO PHQ9 QUESTIONS 1 AND 2: 0
1. LITTLE INTEREST OR PLEASURE IN DOING THINGS: NOT AT ALL
2. FEELING DOWN, DEPRESSED OR HOPELESS: NOT AT ALL

## 2024-12-12 ASSESSMENT — PAIN SCALES - GENERAL: PAINLEVEL_OUTOF10: 0-NO PAIN

## 2024-12-12 ASSESSMENT — ENCOUNTER SYMPTOMS
DEPRESSION: 0
OCCASIONAL FEELINGS OF UNSTEADINESS: 0
LOSS OF SENSATION IN FEET: 0

## 2024-12-12 NOTE — PROGRESS NOTES
Subjective   Patient ID: Renata Arias is a 69 y.o. female who presents for Follow-up.    HPI     Patient is here today to follow up regarding blood pressure management. She reports that her BP this morning at home was 141/85 which was decreased from 184/97 on her previous appointment. Patient reports being consistent in logging her BP but did not bring her log today because it was misplaced over the holidays. Patient reports that her blood pressure measurements at the clinic is always elevated compared to her measurements at home.     Patient additionally reports that she has been implementing lifestyle modifications including decreased salt intake and increasing non-processed foods such as fruits and vegetables. Denies side effects from her current medication regimen.    Review of Systems  Unremarkable    Objective   /89 (BP Location: Right arm, Patient Position: Sitting, BP Cuff Size: Adult)   Pulse 69   Temp 36.8 °C (98.2 °F) (Temporal)   Resp 18   Ht 1.829 m (6')   Wt 107 kg (236 lb)   SpO2 97%   BMI 32.01 kg/m²     Physical Exam    Constitutional:       General: She is not in acute distress.     Appearance: Normal appearance.   HENT:      Head: Atraumatic.      Right Ear: External ear normal.      Left Ear: External ear normal.      Mouth/Throat:      Mouth: Mucous membranes are moist.      Pharynx: Oropharynx is clear.   Eyes:      Extraocular Movements: Extraocular movements intact.      Conjunctiva/sclera: Conjunctivae normal.      Pupils: Pupils are equal, round, and reactive to light.   Cardiovascular:      Rate and Rhythm: Normal rate and regular rhythm.      Pulses: Normal pulses.      Heart sounds: Normal heart sounds. No murmur heard.     No gallop.   Pulmonary:      Effort: No respiratory distress.      Breath sounds: Normal breath sounds. No wheezing or rales.   Abdominal:      General: Bowel sounds are normal.      Palpations: There is no mass.      Tenderness: There is no abdominal  tenderness. There is no guarding.   Musculoskeletal:         General: No swelling. Normal range of motion.      Cervical back: Normal range of motion. No tenderness.   Lymphadenopathy:      Cervical: No cervical adenopathy.   Skin:     General: Skin is warm and dry.      Capillary Refill: Capillary refill takes less than 2 seconds.   Neurological:      Mental Status: She is alert.     Assessment/Plan   Ms. Renata Arias is a 69 y.o. female who presents for follow-up for blood pressure management    # Blood pressure management  - BP has declined steadily since previous visit and better controlled at home however, not yet at goal of <130/80  - Maintain current regimen of amlodipine + lisinopril/hydrochlorothiazide  - Advise continued lifestyle modifications to manage BP  - Offered BP logs and pt to bring back during fu   - Follow up in 4 months to reassess    CHUCKY TURCIOS MS3    Pt staffed with attending physician Dr. Ruth     I was present and supervised the medical student involved in this documentation. I independently examined this patient on the date of service. I made edits to this documentation where appropriate and I agree with the above. This patient's assessment and plan were discussed with an attending.    FRIDA Mosquera MD   Family Medicine, PGY2

## 2025-02-08 DIAGNOSIS — I10 PRIMARY HYPERTENSION: ICD-10-CM

## 2025-02-17 RX ORDER — AMLODIPINE BESYLATE 5 MG/1
5 TABLET ORAL DAILY
Qty: 90 TABLET | Refills: 1 | Status: SHIPPED | OUTPATIENT
Start: 2025-02-17

## 2025-04-01 DIAGNOSIS — I10 PRIMARY HYPERTENSION: ICD-10-CM

## 2025-04-01 RX ORDER — LISINOPRIL AND HYDROCHLOROTHIAZIDE 20; 25 MG/1; MG/1
2 TABLET ORAL DAILY
Qty: 60 TABLET | Refills: 11 | Status: SHIPPED | OUTPATIENT
Start: 2025-04-01 | End: 2026-04-01

## 2025-04-14 DIAGNOSIS — Z98.890 H/O THYROIDECTOMY: ICD-10-CM

## 2025-04-14 DIAGNOSIS — Z90.89 H/O THYROIDECTOMY: ICD-10-CM

## 2025-04-14 DIAGNOSIS — R73.03 PREDIABETES: ICD-10-CM

## 2025-04-14 DIAGNOSIS — I10 PRIMARY HYPERTENSION: ICD-10-CM

## 2025-04-14 RX ORDER — AMLODIPINE BESYLATE 5 MG/1
5 TABLET ORAL DAILY
Qty: 90 TABLET | Refills: 1 | Status: SHIPPED | OUTPATIENT
Start: 2025-04-14

## 2025-04-14 RX ORDER — LISINOPRIL AND HYDROCHLOROTHIAZIDE 20; 25 MG/1; MG/1
2 TABLET ORAL DAILY
Qty: 60 TABLET | Refills: 11 | Status: SHIPPED | OUTPATIENT
Start: 2025-04-14 | End: 2026-04-14

## 2025-04-14 RX ORDER — ATORVASTATIN CALCIUM 40 MG/1
40 TABLET, FILM COATED ORAL NIGHTLY
Qty: 90 TABLET | Refills: 3 | Status: SHIPPED | OUTPATIENT
Start: 2025-04-14

## 2025-04-14 RX ORDER — LEVOTHYROXINE SODIUM 150 UG/1
150 TABLET ORAL DAILY
Qty: 90 TABLET | Refills: 3 | Status: SHIPPED | OUTPATIENT
Start: 2025-04-14

## 2025-05-05 ENCOUNTER — APPOINTMENT (OUTPATIENT)
Dept: PRIMARY CARE | Facility: CLINIC | Age: 70
End: 2025-05-05
Payer: COMMERCIAL

## 2025-05-05 VITALS
OXYGEN SATURATION: 97 % | HEART RATE: 67 BPM | DIASTOLIC BLOOD PRESSURE: 116 MMHG | BODY MASS INDEX: 29.72 KG/M2 | SYSTOLIC BLOOD PRESSURE: 179 MMHG | HEIGHT: 72 IN | WEIGHT: 219.4 LBS | TEMPERATURE: 96.1 F

## 2025-05-05 DIAGNOSIS — H26.8 OTHER CATARACT OF LEFT EYE: ICD-10-CM

## 2025-05-05 DIAGNOSIS — G89.29 CHRONIC BILATERAL LOW BACK PAIN WITHOUT SCIATICA: ICD-10-CM

## 2025-05-05 DIAGNOSIS — M54.50 CHRONIC BILATERAL LOW BACK PAIN WITHOUT SCIATICA: ICD-10-CM

## 2025-05-05 DIAGNOSIS — I10 PRIMARY HYPERTENSION: ICD-10-CM

## 2025-05-05 DIAGNOSIS — N18.32 CHRONIC KIDNEY DISEASE, STAGE 3B (MULTI): Primary | ICD-10-CM

## 2025-05-05 PROCEDURE — 3080F DIAST BP >= 90 MM HG: CPT

## 2025-05-05 PROCEDURE — 3077F SYST BP >= 140 MM HG: CPT

## 2025-05-05 PROCEDURE — 1159F MED LIST DOCD IN RCRD: CPT

## 2025-05-05 PROCEDURE — G2211 COMPLEX E/M VISIT ADD ON: HCPCS

## 2025-05-05 PROCEDURE — 99213 OFFICE O/P EST LOW 20 MIN: CPT | Mod: GE

## 2025-05-05 PROCEDURE — 1125F AMNT PAIN NOTED PAIN PRSNT: CPT

## 2025-05-05 PROCEDURE — 1036F TOBACCO NON-USER: CPT

## 2025-05-05 PROCEDURE — 99213 OFFICE O/P EST LOW 20 MIN: CPT

## 2025-05-05 PROCEDURE — 3008F BODY MASS INDEX DOCD: CPT

## 2025-05-05 RX ORDER — AMLODIPINE BESYLATE 5 MG/1
10 TABLET ORAL DAILY
Qty: 90 TABLET | Refills: 3 | Status: SHIPPED | OUTPATIENT
Start: 2025-05-05

## 2025-05-05 RX ORDER — AMLODIPINE BESYLATE 5 MG/1
5 TABLET ORAL DAILY
Qty: 90 TABLET | Refills: 3 | Status: CANCELLED | OUTPATIENT
Start: 2025-05-05

## 2025-05-05 ASSESSMENT — PAIN SCALES - GENERAL: PAINLEVEL_OUTOF10: 6

## 2025-05-05 NOTE — PROGRESS NOTES
I reviewed the resident/fellow's documentation and discussed the patient with the resident/fellow. I agree with the resident/fellow's medical decision making as documented in the note.    Pacheco Keith MD

## 2025-05-05 NOTE — PROGRESS NOTES
Patient ID: Renata Arias is a 69 y.o. female with PMH of HTN and CKD3 who presents for Follow-up (Handicap sticker ).    Subjective     HPI  Patient reports difficulty getting her BP medications for the past months, she has called the pharmacy multiple times for refills however, her refills were delayed. She has been without her bp medications for a month and recently started taking them again. She did not take any of her medication this morning. No HA, CP, SOB.     Review of Systems   All other systems reviewed and are negative.    Medical History[1]  Surgical History[2]  Family History[3]  Penicillins   Social History     Tobacco Use    Smoking status: Never    Smokeless tobacco: Never   Substance Use Topics    Alcohol use: Not Currently       Medications Ordered Prior to Encounter[4]     Objective   Vitals: BP (!) 179/116 (BP Location: Right arm, Patient Position: Sitting, BP Cuff Size: Adult)   Pulse 67   Temp 35.6 °C (96.1 °F) (Temporal)   Ht 1.829 m (6')   Wt 99.5 kg (219 lb 6.4 oz)   SpO2 97%   BMI 29.76 kg/m²      Physical Exam  Vitals reviewed.   Constitutional:       General: She is not in acute distress.     Appearance: Normal appearance.   HENT:      Head: Atraumatic.      Mouth/Throat:      Mouth: Mucous membranes are moist.      Pharynx: Oropharynx is clear.   Eyes:      Extraocular Movements: Extraocular movements intact.      Conjunctiva/sclera: Conjunctivae normal.      Pupils: Pupils are equal, round, and reactive to light.   Cardiovascular:      Rate and Rhythm: Normal rate and regular rhythm.      Pulses: Normal pulses.      Heart sounds: Normal heart sounds. No murmur heard.     No gallop.   Pulmonary:      Effort: No respiratory distress.      Breath sounds: Normal breath sounds. No wheezing or rales.   Abdominal:      General: Bowel sounds are normal.      Palpations: There is no mass.      Tenderness: There is no abdominal tenderness. There is no guarding.   Skin:     General: Skin  is warm and dry.   Neurological:      Mental Status: She is alert.         Assessment/Plan   Problem List Items Addressed This Visit          Cardiac and Vasculature    Hypertension    - BP remains elevated to 179/116 however patient has not been taking BP medication regularly due to issue with her pharmacy. She does report better controlled at home however, not yet at goal of <130/80  - Maintain current dose of lisinopril/hydrochlorothiazide and increase amlodipine to 10 mg daily and patient to bring in BP logs during next visit   - Advised to continued lifestyle modifications to help manage BP         Relevant Medications    amLODIPine (Norvasc) 5 mg tablet       Genitourinary and Reproductive    Chronic kidney disease, stage 3b (Multi) - Primary    Relevant Orders    Comprehensive metabolic panel    Albumin-Creatinine Ratio, Urine Random    Referral to Nephrology     Other Visit Diagnoses         Chronic bilateral low back pain without sciatica        Relevant Orders    Disability Placard      Other cataract of left eye        Relevant Orders    Disability Placard            Patient d/w attending Dr. Keith    RTC in 2 month, or earlier as needed.    FRIDA Mosquera MD   Family Medicine, PGY2        [1]   Past Medical History:  Diagnosis Date    Chronic kidney disease, unspecified 03/19/2018    CKD (chronic kidney disease)    Chronic kidney disease, unspecified 03/14/2018    CKD (chronic kidney disease)    Hyperlipidemia, unspecified 03/19/2018    Hyperlipidemia    Hyperlipidemia, unspecified 03/14/2018    Hyperlipidemia    Other specified disorders of breast 03/19/2018    Breast asymmetry    Other specified disorders of breast 03/14/2018    Breast asymmetry    Personal history of other diseases of urinary system     History of renal insufficiency syndrome    Personal history of other diseases of urinary system 07/21/2013    History of chronic kidney disease    Prediabetes 03/14/2018    Prediabetes     Prediabetes 03/19/2018    Prediabetes    Pulmonary hypertension, unspecified (Multi)     Pulmonary hypertension    Unspecified blepharitis left eye, unspecified eyelid 05/07/2015    Blepharitis of left eye    Unspecified blepharitis left eye, unspecified eyelid 05/07/2015    Blepharitis of left eye    Unspecified blepharitis left eye, unspecified eyelid 05/07/2015    Blepharitis of left eye    Unspecified blepharitis left eye, unspecified eyelid 05/14/2015    Blepharitis of left eye    Unspecified exophthalmos 03/19/2018    Exophthalmos    Unspecified exophthalmos 03/14/2018    Exophthalmos    Unspecified visual loss 03/19/2018    Vision impairment    Unspecified visual loss 03/14/2018    Vision impairment   [2]   Past Surgical History:  Procedure Laterality Date    THYROID SURGERY  08/07/2013    Thyroid Surgery   [3] No family history on file.  [4]   Current Outpatient Medications on File Prior to Visit   Medication Sig Dispense Refill    atorvastatin (Lipitor) 40 mg tablet Take 1 tablet (40 mg) by mouth once daily at bedtime. 90 tablet 3    ferrous sulfate 324 mg (65 mg elemental iron) EC tablet (delayed release) Take 1 tablet (65 mg) by mouth once daily. 90 tablet 3    levothyroxine (Synthroid, Levoxyl) 150 mcg tablet Take 1 tablet (150 mcg) by mouth once daily. 90 tablet 3    lisinopriL-hydrochlorothiazide 20-25 mg tablet Take 2 tablets by mouth once daily. 60 tablet 11    Vitamin D3 25 mcg (1,000 unit) capsule Take 1 capsule (25 mcg) by mouth once daily. 90 capsule 3    [DISCONTINUED] amLODIPine (Norvasc) 5 mg tablet Take 1 tablet (5 mg) by mouth once daily. (Patient not taking: Reported on 5/5/2025) 90 tablet 1     No current facility-administered medications on file prior to visit.

## 2025-05-05 NOTE — ASSESSMENT & PLAN NOTE
- BP remains elevated to 179/116 however patient has not been taking BP medication regularly due to issue with her pharmacy. She does report better controlled at home however, not yet at goal of <130/80  - Maintain current dose of lisinopril/hydrochlorothiazide and increase amlodipine to 10 mg daily and patient to bring in BP logs during next visit   - Advised to continued lifestyle modifications to help manage BP

## 2025-05-06 LAB
ALBUMIN SERPL-MCNC: 4 G/DL (ref 3.6–5.1)
ALBUMIN/CREAT UR: 397 MG/G CREAT
ALP SERPL-CCNC: 163 U/L (ref 37–153)
ALT SERPL-CCNC: 13 U/L (ref 6–29)
ANION GAP SERPL CALCULATED.4IONS-SCNC: 13 MMOL/L (CALC) (ref 7–17)
AST SERPL-CCNC: 20 U/L (ref 10–35)
BILIRUB SERPL-MCNC: 0.7 MG/DL (ref 0.2–1.2)
BUN SERPL-MCNC: 20 MG/DL (ref 7–25)
CALCIUM SERPL-MCNC: 9.8 MG/DL (ref 8.6–10.4)
CHLORIDE SERPL-SCNC: 103 MMOL/L (ref 98–110)
CO2 SERPL-SCNC: 24 MMOL/L (ref 20–32)
CREAT SERPL-MCNC: 1.54 MG/DL (ref 0.5–1.05)
CREAT UR-MCNC: 271 MG/DL (ref 20–275)
EGFRCR SERPLBLD CKD-EPI 2021: 36 ML/MIN/1.73M2
GLUCOSE SERPL-MCNC: 88 MG/DL (ref 65–99)
MICROALBUMIN UR-MCNC: 107.5 MG/DL
POTASSIUM SERPL-SCNC: 4.3 MMOL/L (ref 3.5–5.3)
PROT SERPL-MCNC: 7.7 G/DL (ref 6.1–8.1)
SODIUM SERPL-SCNC: 140 MMOL/L (ref 135–146)

## 2025-06-02 DIAGNOSIS — Z86.2 HISTORY OF ANEMIA: ICD-10-CM

## 2025-06-02 DIAGNOSIS — Z86.39 HISTORY OF VITAMIN D DEFICIENCY: ICD-10-CM

## 2025-06-02 RX ORDER — FERROUS SULFATE 324(65)MG
1 TABLET, DELAYED RELEASE (ENTERIC COATED) ORAL DAILY
Qty: 90 TABLET | Refills: 3 | Status: SHIPPED | OUTPATIENT
Start: 2025-06-02

## 2025-06-02 RX ORDER — AMPICILLIN TRIHYDRATE 500 MG
25 CAPSULE ORAL DAILY
Qty: 90 CAPSULE | Refills: 3 | Status: SHIPPED | OUTPATIENT
Start: 2025-06-02

## 2025-06-02 NOTE — TELEPHONE ENCOUNTER
Pt called requesting refill of uncertain medications. Call placed to pt for clarification of need. Pt stated she only received 3 levothyroxine tabs, and the local retail pharmacy stated they needed to hear from provider. Nurse informed pt she can call [facility] to request free delivery, or contact [facility] and ask tem to contact [facility] for transfer. [facility] contact info given to pt, and pharmacy removed from pt chart as requested d/t preference of using [facility] only. Iron and Vitamin D pended and routed to provider. Opt made aware of 72-hour task policy and confirms understanding.

## 2025-06-11 DIAGNOSIS — Z86.2 HISTORY OF ANEMIA: ICD-10-CM

## 2025-06-11 DIAGNOSIS — Z90.89 H/O THYROIDECTOMY: ICD-10-CM

## 2025-06-11 DIAGNOSIS — R73.03 PREDIABETES: ICD-10-CM

## 2025-06-11 DIAGNOSIS — Z98.890 H/O THYROIDECTOMY: ICD-10-CM

## 2025-06-13 RX ORDER — FERROUS SULFATE 324(65)MG
1 TABLET, DELAYED RELEASE (ENTERIC COATED) ORAL DAILY
Qty: 90 TABLET | Refills: 3 | OUTPATIENT
Start: 2025-06-13

## 2025-06-13 RX ORDER — ATORVASTATIN CALCIUM 40 MG/1
40 TABLET, FILM COATED ORAL NIGHTLY
Qty: 90 TABLET | Refills: 3 | Status: SHIPPED | OUTPATIENT
Start: 2025-06-13

## 2025-06-13 RX ORDER — LEVOTHYROXINE SODIUM 150 UG/1
150 TABLET ORAL DAILY
Qty: 90 TABLET | Refills: 3 | Status: SHIPPED | OUTPATIENT
Start: 2025-06-13